# Patient Record
Sex: FEMALE | Race: WHITE | Employment: OTHER | URBAN - METROPOLITAN AREA
[De-identification: names, ages, dates, MRNs, and addresses within clinical notes are randomized per-mention and may not be internally consistent; named-entity substitution may affect disease eponyms.]

---

## 2017-04-07 RX ORDER — DILTIAZEM HYDROCHLORIDE 120 MG/1
CAPSULE, COATED, EXTENDED RELEASE ORAL
Qty: 90 CAPSULE | Refills: 3 | Status: SHIPPED | OUTPATIENT
Start: 2017-04-07 | End: 2018-04-13 | Stop reason: SDUPTHER

## 2017-05-17 ENCOUNTER — TELEPHONE (OUTPATIENT)
Dept: CARDIOLOGY CLINIC | Age: 82
End: 2017-05-17

## 2017-07-11 ENCOUNTER — HOSPITAL ENCOUNTER (OUTPATIENT)
Dept: WOMENS IMAGING | Age: 82
Discharge: HOME | End: 2017-07-11
Attending: NURSE PRACTITIONER | Admitting: FAMILY MEDICINE

## 2017-07-11 DIAGNOSIS — Z12.31 SCREENING MAMMOGRAM, ENCOUNTER FOR: ICD-10-CM

## 2017-07-20 ENCOUNTER — HOSPITAL ENCOUNTER (OUTPATIENT)
Dept: GENERAL RADIOLOGY | Age: 82
Discharge: HOME | End: 2017-07-20
Attending: INTERNAL MEDICINE | Admitting: INTERNAL MEDICINE

## 2017-07-20 ENCOUNTER — OFFICE VISIT (OUTPATIENT)
Dept: CARDIOLOGY CLINIC | Age: 82
End: 2017-07-20

## 2017-07-20 VITALS
SYSTOLIC BLOOD PRESSURE: 128 MMHG | DIASTOLIC BLOOD PRESSURE: 68 MMHG | HEIGHT: 61 IN | HEART RATE: 68 BPM | WEIGHT: 191.8 LBS | BODY MASS INDEX: 36.21 KG/M2

## 2017-07-20 DIAGNOSIS — R06.02 SOB (SHORTNESS OF BREATH): ICD-10-CM

## 2017-07-20 DIAGNOSIS — I10 ESSENTIAL HYPERTENSION: Primary | ICD-10-CM

## 2017-07-20 DIAGNOSIS — E78.5 HYPERLIPIDEMIA, UNSPECIFIED HYPERLIPIDEMIA TYPE: ICD-10-CM

## 2017-07-20 LAB
ALBUMIN SERPL-MCNC: 4.5 GM/DL (ref 3.4–5)
ALP BLD-CCNC: 35 IU/L (ref 40–128)
ALT SERPL-CCNC: 15 U/L (ref 10–40)
ANION GAP SERPL CALCULATED.3IONS-SCNC: 13 MMOL/L (ref 4–16)
AST SERPL-CCNC: 20 IU/L (ref 15–37)
BILIRUB SERPL-MCNC: 1.2 MG/DL (ref 0–1)
BUN BLDV-MCNC: 20 MG/DL (ref 6–23)
CALCIUM SERPL-MCNC: 10 MG/DL (ref 8.3–10.6)
CHLORIDE BLD-SCNC: 102 MMOL/L (ref 99–110)
CHOLESTEROL: 177 MG/DL
CO2: 28 MMOL/L (ref 21–32)
CREAT SERPL-MCNC: 0.8 MG/DL (ref 0.6–1.1)
GFR AFRICAN AMERICAN: >60 ML/MIN/1.73M2
GFR NON-AFRICAN AMERICAN: >60 ML/MIN/1.73M2
GLUCOSE FASTING: 84 MG/DL (ref 70–99)
HCT VFR BLD CALC: 40.9 % (ref 37–47)
HDLC SERPL-MCNC: 62 MG/DL
HEMOGLOBIN: 14 GM/DL (ref 12.5–16)
LDL CHOLESTEROL CALCULATED: 90 MG/DL
MCH RBC QN AUTO: 32 PG (ref 27–31)
MCHC RBC AUTO-ENTMCNC: 34.2 % (ref 32–36)
MCV RBC AUTO: 93.6 FL (ref 78–100)
PDW BLD-RTO: 12.7 % (ref 11.7–14.9)
PLATELET # BLD: 286 K/CU MM (ref 140–440)
PMV BLD AUTO: 11.4 FL (ref 7.5–11.1)
POTASSIUM SERPL-SCNC: 4.5 MMOL/L (ref 3.5–5.1)
RBC # BLD: 4.37 M/CU MM (ref 4.2–5.4)
SODIUM BLD-SCNC: 143 MMOL/L (ref 135–145)
TOTAL PROTEIN: 6.6 GM/DL (ref 6.4–8.2)
TRIGL SERPL-MCNC: 125 MG/DL
WBC # BLD: 6.2 K/CU MM (ref 4–10.5)

## 2017-07-20 PROCEDURE — 99214 OFFICE O/P EST MOD 30 MIN: CPT | Performed by: INTERNAL MEDICINE

## 2018-04-13 RX ORDER — DILTIAZEM HYDROCHLORIDE 120 MG/1
120 CAPSULE, COATED, EXTENDED RELEASE ORAL DAILY
Qty: 90 CAPSULE | Refills: 3 | Status: SHIPPED | OUTPATIENT
Start: 2018-04-13 | End: 2019-05-20 | Stop reason: SDUPTHER

## 2018-07-13 ENCOUNTER — HOSPITAL ENCOUNTER (OUTPATIENT)
Dept: WOMENS IMAGING | Age: 83
Discharge: HOME | End: 2018-07-13
Attending: FAMILY MEDICINE | Admitting: FAMILY MEDICINE

## 2018-07-13 DIAGNOSIS — Z12.31 SCREENING MAMMOGRAM, ENCOUNTER FOR: ICD-10-CM

## 2018-08-24 ENCOUNTER — OFFICE VISIT (OUTPATIENT)
Dept: CARDIOLOGY CLINIC | Age: 83
End: 2018-08-24

## 2018-08-24 VITALS
WEIGHT: 194.8 LBS | BODY MASS INDEX: 36.78 KG/M2 | HEIGHT: 61 IN | HEART RATE: 70 BPM | DIASTOLIC BLOOD PRESSURE: 70 MMHG | SYSTOLIC BLOOD PRESSURE: 120 MMHG

## 2018-08-24 DIAGNOSIS — I20.8 ANGINAL EQUIVALENT (HCC): ICD-10-CM

## 2018-08-24 DIAGNOSIS — I10 ESSENTIAL HYPERTENSION: Primary | ICD-10-CM

## 2018-08-24 PROBLEM — I20.89 ANGINAL EQUIVALENT (HCC): Status: ACTIVE | Noted: 2018-08-24

## 2018-08-24 PROCEDURE — G8598 ASA/ANTIPLAT THER USED: HCPCS | Performed by: INTERNAL MEDICINE

## 2018-08-24 PROCEDURE — 99213 OFFICE O/P EST LOW 20 MIN: CPT | Performed by: INTERNAL MEDICINE

## 2018-08-24 PROCEDURE — G8417 CALC BMI ABV UP PARAM F/U: HCPCS | Performed by: INTERNAL MEDICINE

## 2018-08-24 PROCEDURE — 1090F PRES/ABSN URINE INCON ASSESS: CPT | Performed by: INTERNAL MEDICINE

## 2018-08-24 PROCEDURE — G8427 DOCREV CUR MEDS BY ELIG CLIN: HCPCS | Performed by: INTERNAL MEDICINE

## 2018-08-24 PROCEDURE — 1101F PT FALLS ASSESS-DOCD LE1/YR: CPT | Performed by: INTERNAL MEDICINE

## 2018-08-24 PROCEDURE — G8400 PT W/DXA NO RESULTS DOC: HCPCS | Performed by: INTERNAL MEDICINE

## 2018-08-24 PROCEDURE — 4040F PNEUMOC VAC/ADMIN/RCVD: CPT | Performed by: INTERNAL MEDICINE

## 2018-08-24 PROCEDURE — 1123F ACP DISCUSS/DSCN MKR DOCD: CPT | Performed by: INTERNAL MEDICINE

## 2018-08-24 PROCEDURE — 1036F TOBACCO NON-USER: CPT | Performed by: INTERNAL MEDICINE

## 2018-08-24 NOTE — PROGRESS NOTES
CARDIOLOGY NOTE      8/24/2018    RE: Liam Marquis  (1935)                               TO:  Dr. Nellie Ross, DO      Thank you for involving me in taking care of your  patient Liam Marquis, who is a  81y.o. year old      female with past medical  history of  Htn, hyperlipidimea  is  seen today Patient  during this  visit c/o recurrent episodes of jacob neck pain with no exertion , thinks its reflux. .      Vitals:    08/24/18 1114   BP: 120/70   Pulse: 70       Current Outpatient Prescriptions   Medication Sig Dispense Refill    diltiazem (CARDIZEM CD) 120 MG extended release capsule Take 1 capsule by mouth daily 90 capsule 3    lisinopril (PRINIVIL;ZESTRIL) 20 MG tablet Take 20 mg by mouth daily      meclizine (ANTIVERT) 25 MG tablet Take 25 mg by mouth 3 times daily as needed      alendronate (FOSAMAX) 70 MG tablet Take 70 mg by mouth every 7 days      Potassium Chloride Katrin CR (KLOR-CON M20 PO) Take 1 tablet by mouth daily      escitalopram (LEXAPRO) 20 MG tablet Take 20 mg by mouth daily      meloxicam (MOBIC) 15 MG tablet Take 15 mg by mouth daily      Calcium Carb-Cholecalciferol (CALCIUM PLUS VITAMIN D3 PO) Take 1 tablet by mouth daily      Acetaminophen (TYLENOL PO) Take 2 tablets by mouth daily      Cholecalciferol (VITAMIN D3) 2000 UNITS CAPS Take 2 tablets by mouth daily       furosemide (LASIX) 40 MG tablet Take 40 mg by mouth daily.  raloxifene (EVISTA) 60 MG tablet Take 60 mg by mouth daily.  aspirin 81 MG tablet Take 81 mg by mouth daily.  multivitamin (THERAGRAN) per tablet Take 1 tablet by mouth daily.  Pantoprazole Sodium (PROTONIX) 40 MG PACK packet Take 40 mg by mouth daily.  b complex vitamins capsule Take 1 capsule by mouth daily.  FISH OIL 1,000 mg by Does not apply route daily. No current facility-administered medications for this visit. Allergies: Patient has no known allergies.   Past Medical History: Diagnosis Date    Depression     Dizzy spells     H/O 24 hour EKG monitoring 06/28/2006 06/28/2006 predominant rhythm is sinus, no ectopy noted, infrequent ventricular and supraventricular ectopic beats are seen, the max heart rate is 135, min 59 with an average of 77 bpm, longest R-R interval is 1.1 seconds    H/O cardiovascular stress test 06/26/2006 EF 67%, 08/9/2010 EF 70%, 10/18/2011 EF 70%    10/18/2011 EF 70%normal pattern of perfusion in all myocardial regions, resting ED 56ml, resting ES 12ml, there is no scintigraphic evidence of inducible myocardial ischemia, global left ventricular systolic function is normal, exercise capacity 4.6 METS it is mildly reduced, NO ECG changes    H/O cardiovascular stress test 5/13/2016    lexiscan-normal,EF70%    H/O complete electrocardiogram 08/05/2010 08/05/2010 on chart    H/O echocardiogram 4/30/13,8/6/09 4/13- WNL, EF >55% 08/09 EF 55% left ventricular systolic function is normal, mild aortic valve calcification, No PE    H/O echocardiogram 5/13/2016    EF 55-60%, Normal study    Hyperlipidemia     Hypertension     PVD (peripheral vascular disease) ([address]     Reflux     SOB (shortness of breath)     Wears glasses      Past Surgical History:   Procedure Laterality Date    BUNIONECTOMY      CATARACT REMOVAL      EYE SURGERY      GALLBLADDER SURGERY      SPINE SURGERY  10/2016    spinal cord stimulator    TONSILLECTOMY AND ADENOIDECTOMY      TUMOR REMOVAL      fatty tumor    WRIST GANGLION EXCISION       History reviewed. No pertinent family history.   Social History   Substance Use Topics    Smoking status: Never Smoker    Smokeless tobacco: Never Used    Alcohol use Yes      Comment: rarely, 1 cup of coffee daily          Review of systems:  HEENT: Neg  Card:Neck pain   GI;Neg  : Neg  Neuro: Neg  Psych: Neg  Derm: Neg  MS; Neg  All: Documented  Constitutional: Neg    Objective:      Physical Exam:  /70 (Position: Standing) Pulse 70   Ht 5' 1\" (1.549 m)   Wt 194 lb 12.8 oz (88.4 kg)   BMI 36.81 kg/m²   Wt Readings from Last 3 Encounters:   08/24/18 194 lb 12.8 oz (88.4 kg)   07/20/17 191 lb 12.8 oz (87 kg)   06/09/16 195 lb (88.5 kg)     Body mass index is 36.81 kg/m². GENERAL - Alert, oriented, pleasant, in no apparent distress. Head unremarkable  Eyes  Not injected conjunctiva  ENT  normal mucosa  Neck - Supple. No jugular venous distention noted. No carotid bruits. Cardiovascular  Normal S1 and S2 without obvious murmur or gallop. Extremities - No cyanosis, clubbing, or significant edema. Pulmonary  No respiratory distress. No wheezes or rales. Pulses: Bilateral radial and pedal pulses normal  Abdomen  no tenderness  Musculoskeletal  normal strength  Neurologic    There are  no gross focal neurologic abnormalities. Skin-  No rash  Affect; normal mood    DATA:  No results found for: CKTOTAL, CKMB, CKMBINDEX, TROPONINI  BNP:  No results found for: BNP  PT/INR:  No results found for: PTINR  No results found for: LABA1C  Lab Results   Component Value Date    CHOL 177 07/20/2017    TRIG 125 07/20/2017    HDL 62 07/20/2017    LDLCALC 90 07/20/2017    LDLDIRECT 115 (H) 10/14/2011     Lab Results   Component Value Date    ALT 15 07/20/2017    AST 20 07/20/2017     TSH:  No results found for: TSH    QUALITY MEASURES:    CHOL LOWERING:  No- if No Why  ANTIPLATELET:  No - if No why  BETA BLOCKER    no  IF  NO WHY  SMOKING HISTORY No COUNSELLED  No  ATRIAL FIBRILLATION  No   ANTICOAG: No        Assessment/ Plan:     Patient seen , interviewed and examined     PLAN:       - Neck pain ? Anginal equivalent,  [place]      -  Hypertension: Patients blood pressure is normal. Patient is advised about low sodium diet. Present medical regimen will not be changed. -  LIPID MANAGEMENT:  Available lipid  lab data reviewed  and patient was given dietary advice. NCEP- ATP III guidelines reviewed with patient.     -   Changes  in

## 2018-08-30 ENCOUNTER — PROCEDURE VISIT (OUTPATIENT)
Dept: CARDIOLOGY CLINIC | Age: 83
End: 2018-08-30

## 2018-08-30 DIAGNOSIS — I20.8 ANGINAL EQUIVALENT (HCC): ICD-10-CM

## 2018-08-30 DIAGNOSIS — I10 ESSENTIAL HYPERTENSION: ICD-10-CM

## 2018-08-30 LAB
LV EF: 60 %
LVEF MODALITY: NORMAL

## 2018-08-30 PROCEDURE — 93015 CV STRESS TEST SUPVJ I&R: CPT | Performed by: INTERNAL MEDICINE

## 2018-08-30 PROCEDURE — 78452 HT MUSCLE IMAGE SPECT MULT: CPT | Performed by: INTERNAL MEDICINE

## 2018-08-30 PROCEDURE — A9500 TC99M SESTAMIBI: HCPCS | Performed by: INTERNAL MEDICINE

## 2018-09-05 ENCOUNTER — TELEPHONE (OUTPATIENT)
Dept: CARDIOLOGY CLINIC | Age: 83
End: 2018-09-05

## 2018-09-05 NOTE — TELEPHONE ENCOUNTER
Spoke to patient regarding results of stress test.    Summary    Supervising physician Dr. Montes Outlaw Stress nuclear scintigraphic study suggestive of normal myocardial    perfusion.    Gated images demonstrate normal left ventricular systolic function with EF    of 60 %.         Recommendation    Continue present medical therapy and followup in office as scheduled

## 2018-10-16 ENCOUNTER — TELEPHONE (OUTPATIENT)
Dept: CARDIOLOGY CLINIC | Age: 83
End: 2018-10-16

## 2019-05-20 ENCOUNTER — OFFICE VISIT (OUTPATIENT)
Dept: CARDIOLOGY CLINIC | Age: 84
End: 2019-05-20
Payer: MEDICARE

## 2019-05-20 VITALS
SYSTOLIC BLOOD PRESSURE: 110 MMHG | WEIGHT: 174.4 LBS | HEART RATE: 64 BPM | DIASTOLIC BLOOD PRESSURE: 70 MMHG | HEIGHT: 61 IN | BODY MASS INDEX: 32.93 KG/M2

## 2019-05-20 DIAGNOSIS — I10 ESSENTIAL HYPERTENSION: Primary | ICD-10-CM

## 2019-05-20 PROCEDURE — 99213 OFFICE O/P EST LOW 20 MIN: CPT | Performed by: INTERNAL MEDICINE

## 2019-05-20 PROCEDURE — 1036F TOBACCO NON-USER: CPT | Performed by: INTERNAL MEDICINE

## 2019-05-20 PROCEDURE — G8400 PT W/DXA NO RESULTS DOC: HCPCS | Performed by: INTERNAL MEDICINE

## 2019-05-20 PROCEDURE — G8427 DOCREV CUR MEDS BY ELIG CLIN: HCPCS | Performed by: INTERNAL MEDICINE

## 2019-05-20 PROCEDURE — 4040F PNEUMOC VAC/ADMIN/RCVD: CPT | Performed by: INTERNAL MEDICINE

## 2019-05-20 PROCEDURE — G8417 CALC BMI ABV UP PARAM F/U: HCPCS | Performed by: INTERNAL MEDICINE

## 2019-05-20 PROCEDURE — G8598 ASA/ANTIPLAT THER USED: HCPCS | Performed by: INTERNAL MEDICINE

## 2019-05-20 PROCEDURE — 1090F PRES/ABSN URINE INCON ASSESS: CPT | Performed by: INTERNAL MEDICINE

## 2019-05-20 PROCEDURE — 1123F ACP DISCUSS/DSCN MKR DOCD: CPT | Performed by: INTERNAL MEDICINE

## 2019-05-20 RX ORDER — DILTIAZEM HYDROCHLORIDE 120 MG/1
120 CAPSULE, COATED, EXTENDED RELEASE ORAL DAILY
Qty: 90 CAPSULE | Refills: 3 | Status: SHIPPED | OUTPATIENT
Start: 2019-05-20 | End: 2020-09-08

## 2019-05-20 NOTE — PROGRESS NOTES
CARDIOLOGY NOTE      5/20/2019    RE: Jose Hutchison  (1935)                               TO:  Dr. Thomas Wilks DO            Jonathon Hodges is a 81 y.o. female who was seen today for management of  htn                                    HPI:   Patient is here for    - Hypertension,is  well controlled, pt is  compliant with medicines  - Hyperlipidimea, lipids are in acceptable range. Pt  is  compliant with medicines                  The patient does not have cardiac complaints  Has musculoskeletal pain per PCP    Jose Hutchison has the following history recorded in care path:  Patient Active Problem List    Diagnosis Date Noted    Anginal equivalent ([facility] [address] 08/24/2018    HTN (hypertension) 04/27/2015    SOB (shortness of breath) 04/26/2013    Dizzy spells     Hyperlipidemia      Current Outpatient Medications   Medication Sig Dispense Refill    diltiazem (CARDIZEM CD) 120 MG extended release capsule Take 1 capsule by mouth daily 90 capsule 3    lisinopril (PRINIVIL;ZESTRIL) 20 MG tablet Take 20 mg by mouth daily      meclizine (ANTIVERT) 25 MG tablet Take 25 mg by mouth 3 times daily as needed      alendronate (FOSAMAX) 70 MG tablet Take 70 mg by mouth every 7 days      Potassium Chloride Katrin CR (KLOR-CON M20 PO) Take 1 tablet by mouth daily      escitalopram (LEXAPRO) 20 MG tablet Take 20 mg by mouth daily      meloxicam (MOBIC) 15 MG tablet Take 15 mg by mouth daily      Calcium Carb-Cholecalciferol (CALCIUM PLUS VITAMIN D3 PO) Take 1 tablet by mouth daily      Acetaminophen (TYLENOL PO) Take 2 tablets by mouth daily      Cholecalciferol (VITAMIN D3) 2000 UNITS CAPS Take 2 tablets by mouth daily       furosemide (LASIX) 40 MG tablet Take 40 mg by mouth daily.  raloxifene (EVISTA) 60 MG tablet Take 60 mg by mouth daily.  aspirin 81 MG tablet Take 81 mg by mouth daily.  multivitamin (THERAGRAN) per tablet Take 1 tablet by mouth daily.       pertinent family history. Social History     Tobacco Use    Smoking status: Never Smoker    Smokeless tobacco: Never Used   Substance Use Topics    Alcohol use: Yes     Comment: rarely, 1 cup of coffee daily      Review of Systems:    Constitutional: Negative for diaphoresis and fatigue  Psychological:Negative for anxiety or depression  HENT: Negative for headaches, nasal congestion, sinus pain or vertigo  Eyes: Negative for visual disturbance. Endocrine: Negative for polydipsia/polyuria  Respiratory: Negative for shortness of breath  Cardiovascular: Negative for chest pain, dyspnea on exertion, claudication, edema, irregular heartbeat, murmur, palpitations or shortness of breath  Gastrointestinal: Negative for abdominal pain or heartburn  Genito-Urinary: Negative for urinary frequency/urgency  Musculoskeletal: Negative for muscle pain, muscular weakness, negative for pain in arm and leg or swelling in foot and leg  Neurological: Negative for dizziness, headaches, memory loss, numbness/tingling, visual changes, syncope  Dermatological: Negative for rash    Objective:  /70   Pulse 64   Ht 5' 1\" (1.549 m)   Wt 174 lb 6.4 oz (79.1 kg)   BMI 32.95 kg/m²   Wt Readings from Last 3 Encounters:   05/20/19 174 lb 6.4 oz (79.1 kg)   08/24/18 194 lb 12.8 oz (88.4 kg)   07/20/17 191 lb 12.8 oz (87 kg)     Body mass index is 32.95 kg/m². GENERAL - Alert, oriented, pleasant, in no apparent distress. EYES: No jaundice, no conjunctival pallor. SKIN: It is warm & dry. No rashes. No Echhymosis    HEENT - No clinically significant abnormalities seen. Neck - Supple. No jugular venous distention noted. No carotid bruits. Cardiovascular - Normal S1 and S2 without obvious murmur or gallop. Extremities - No cyanosis, clubbing, or significant edema. Pulmonary - No respiratory distress. No wheezes or rales. Abdomen - No masses, tenderness, or organomegaly. Musculoskeletal - No significant edema.  No joint deformities. No muscle wasting. Neurologic - Cranial nerves II through XII are grossly intact. There were no gross focal neurologic abnormalities. Lab Review   No results found for: CKTOTAL, CKMB, CKMBINDEX, TROPONINT  BNP:  No results found for: BNP  PT/INR:  No results found for: INR  No results found for: LABA1C  Lab Results   Component Value Date    WBC 6.2 07/20/2017    HCT 40.9 07/20/2017    MCV 93.6 07/20/2017     07/20/2017     Lab Results   Component Value Date    CHOL 177 07/20/2017    TRIG 125 07/20/2017    HDL 62 07/20/2017    LDLCALC 90 07/20/2017    LDLDIRECT 115 (H) 10/14/2011     Lab Results   Component Value Date    ALT 15 07/20/2017    AST 20 07/20/2017     BMP:    Lab Results   Component Value Date     07/20/2017    K 4.5 07/20/2017     07/20/2017    CO2 28 07/20/2017    BUN 20 07/20/2017    CREATININE 0.8 07/20/2017     CMP:   Lab Results   Component Value Date     07/20/2017    K 4.5 07/20/2017     07/20/2017    CO2 28 07/20/2017    BUN 20 07/20/2017    PROT 6.6 07/20/2017     TSH:  No results found for: TSH, TSHHS        Impression:    No diagnosis found. Patient Active Problem List   Diagnosis Code    Dizzy spells R42    Hyperlipidemia E78.5    SOB (shortness of breath) R06.02    HTN (hypertension) I10    Anginal equivalent (HCC) I20.8       Assessment & Plan:    -  Hypertension: Patients blood pressure is normal. Patient is advised about low sodium diet. Present medical regimen will not be changed. -  LIPID MANAGEMENT:  Available lipid  lab data reviewed  and patient was given dietary advice. NCEP- ATP III guidelines reviewed with patient. -   Changes  in medicines made:  No                                        Christina Jones MA  [facility] [place]

## 2019-07-17 ENCOUNTER — HOSPITAL ENCOUNTER (OUTPATIENT)
Dept: WOMENS IMAGING | Age: 84
Discharge: HOME | End: 2019-07-17
Payer: MEDICARE

## 2019-07-17 DIAGNOSIS — Z12.31 VISIT FOR SCREENING MAMMOGRAM: ICD-10-CM

## 2019-07-17 PROCEDURE — 77067 SCR MAMMO BI INCL CAD: CPT

## 2020-06-15 ENCOUNTER — TELEMEDICINE (OUTPATIENT)
Dept: CARDIOLOGY CLINIC | Age: 85
End: 2020-06-15
Payer: MEDICARE

## 2020-06-15 ENCOUNTER — NURSE ONLY (OUTPATIENT)
Dept: CARDIOLOGY CLINIC | Age: 85
End: 2020-06-15

## 2020-06-15 VITALS — DIASTOLIC BLOOD PRESSURE: 82 MMHG | HEART RATE: 80 BPM | SYSTOLIC BLOOD PRESSURE: 120 MMHG

## 2020-06-15 PROCEDURE — 99999 PR OFFICE/OUTPT VISIT,PROCEDURE ONLY: CPT | Performed by: INTERNAL MEDICINE

## 2020-06-15 PROCEDURE — 99443 PR PHYS/QHP TELEPHONE EVALUATION 21-30 MIN: CPT | Performed by: INTERNAL MEDICINE

## 2020-06-15 RX ORDER — LISINOPRIL 40 MG/1
40 TABLET ORAL DAILY
COMMUNITY

## 2020-06-15 RX ORDER — MULTIVIT WITH MINERALS/LUTEIN
1000 TABLET ORAL DAILY
COMMUNITY

## 2020-06-15 RX ORDER — FOLIC ACID 0.8 MG
TABLET ORAL
COMMUNITY

## 2020-06-15 RX ORDER — TRAMADOL HYDROCHLORIDE 50 MG/1
50 TABLET ORAL 2 TIMES DAILY
COMMUNITY

## 2020-06-15 RX ORDER — ATORVASTATIN CALCIUM 10 MG/1
10 TABLET, FILM COATED ORAL DAILY
COMMUNITY

## 2020-06-15 NOTE — PROGRESS NOTES
CARDIOLOGY NOTE      6/15/2020    RE: Kera Lynch  (1935)                               TO:  Dr. Neva Montgomery,             Antony Mendoza is a 81 y.o. female who was seen today for management of  htn                                    HPI:   Patient is here for    - Hypertension,isnot  well controlled, pt is  compliant with medicines  - Hyperlipidimea, lipids are in acceptable range. Pt  is  compliant with medicines                  The patient does not have cardiac complaints  Gets dizzy occasinally, no syncope, storm when standing from sitting , has no cp, sob. Kera Lynch has the following history recorded in care path:  Patient Active Problem List    Diagnosis Date Noted    Anginal equivalent ([facility] [address] 08/24/2018    HTN (hypertension) 04/27/2015    SOB (shortness of breath) 04/26/2013    Dizzy spells     Hyperlipidemia      Current Outpatient Medications   Medication Sig Dispense Refill    lisinopril (PRINIVIL;ZESTRIL) 40 MG tablet Take 40 mg by mouth daily      traMADol (ULTRAM) 50 MG tablet Take 50 mg by mouth 2 times daily.       atorvastatin (LIPITOR) 10 MG tablet Take 10 mg by mouth daily      Magnesium 500 MG CAPS Take by mouth      vitamin E 1000 units capsule Take 1,000 Units by mouth daily      diltiazem (CARDIZEM CD) 120 MG extended release capsule Take 1 capsule by mouth daily 90 capsule 3    meclizine (ANTIVERT) 25 MG tablet Take 25 mg by mouth 3 times daily as needed      Potassium Chloride Katrin CR (KLOR-CON M20 PO) Take 1 tablet by mouth daily      escitalopram (LEXAPRO) 20 MG tablet Take 10 mg by mouth daily       meloxicam (MOBIC) 15 MG tablet Take 15 mg by mouth daily      Calcium Carb-Cholecalciferol (CALCIUM PLUS VITAMIN D3 PO) Take 1 tablet by mouth daily      Acetaminophen (TYLENOL PO) Take 2 tablets by mouth daily      Cholecalciferol (VITAMIN D3) 2000 UNITS CAPS Take 2 tablets by mouth daily       furosemide (LASIX) 40 MG SURGERY      GALLBLADDER SURGERY      SPINE SURGERY  10/2016    spinal cord stimulator    TONSILLECTOMY AND ADENOIDECTOMY      TUMOR REMOVAL      fatty tumor    WRIST GANGLION EXCISION        As reviewed No family history on file. Social History     Tobacco Use    Smoking status: Never Smoker    Smokeless tobacco: Never Used   Substance Use Topics    Alcohol use: Yes     Comment: rarely, 1 cup of coffee daily      Review of Systems:    Constitutional: Negative for diaphoresis and fatigue  Psychological:Negative for anxiety or depression  HENT: Negative for headaches, nasal congestion, sinus pain or vertigo  Eyes: Negative for visual disturbance. Endocrine: Negative for polydipsia/polyuria  Respiratory: Negative for shortness of breath  Cardiovascular: Negative for chest pain, dyspnea on exertion, claudication, edema, irregular heartbeat, murmur, palpitations or shortness of breath  Gastrointestinal: Negative for abdominal pain or heartburn  Genito-Urinary: Negative for urinary frequency/urgency  Musculoskeletal: Negative for muscle pain, muscular weakness, negative for pain in arm and leg or swelling in foot and leg  Neurological: near syncope  Dermatological: Negative for rash    Objective: There were no vitals taken for this visit. Wt Readings from Last 3 Encounters:   05/20/19 174 lb 6.4 oz (79.1 kg)   08/24/18 194 lb 12.8 oz (88.4 kg)   07/20/17 191 lb 12.8 oz (87 kg)     There is no height or weight on file to calculate BMI. GENERAL - Alert, oriented, pleasant, in no apparent distress.     Lab Review   No results found for: CKTOTAL, CKMB, CKMBINDEX, TROPONINT  BNP:  No results found for: BNP  PT/INR:  No results found for: INR  No results found for: LABA1C  Lab Results   Component Value Date    WBC 6.2 07/20/2017    HCT 40.9 07/20/2017    MCV 93.6 07/20/2017     07/20/2017     Lab Results   Component Value Date    CHOL 177 07/20/2017    TRIG 125 07/20/2017    HDL 62 07/20/2017    LDLCALC 90 07/20/2017    LDLDIRECT 115 (H) 10/14/2011     Lab Results   Component Value Date    ALT 15 07/20/2017    AST 20 07/20/2017     BMP:    Lab Results   Component Value Date     07/20/2017    K 4.5 07/20/2017     07/20/2017    CO2 28 07/20/2017    BUN 20 07/20/2017    CREATININE 0.8 07/20/2017     CMP:   Lab Results   Component Value Date     07/20/2017    K 4.5 07/20/2017     07/20/2017    CO2 28 07/20/2017    BUN 20 07/20/2017    PROT 6.6 07/20/2017     TSH:  No results found for: TSH, TSHHS      Impression:    1. Essential hypertension       Patient Active Problem List   Diagnosis Code    Dizzy spells R42    Hyperlipidemia E78.5    SOB (shortness of breath) R06.02    HTN (hypertension) I10    Anginal equivalent (HCC) I20.8       Assessment & Plan:    156/96,  70    - orthostatics, echo    -  Hypertension: Patients blood pressure is normal. Patient is advised about low sodium diet. Present medical regimen will not be changed. -  LIPID MANAGEMENT:  Available lipid  lab data reviewed  and patient was given dietary advice. NCEP- ATP III guidelines reviewed with patient. -   Changes  in medicines made: No                            I confirm that this visit was completed in a telehealth setting ,using synchronous audiotechnology for real time patient interaction . The patient identity with name and date of birth was confirmed . This evaluation of patient was done by telehealth in the setting of FEJCO-14 Public health emergency , which precluded assurance of safe in person visit at the time of service. The patient consented to and accepts potential risks associated with telemedical evaluation and care was taken to assess rustam presence of any medical issues that would be more  appropriate for expedited in -person care.      Pursuant to the emergency declaration under the [address], [address] and Response Supplemental Appropriations Act, this Virtual  Visit was conducted, with patient's consent, to reduce the patient's risk of exposure to COVID-19 and provide continuity of care for an established patient. Services were provided through a  discussion on pphone to substitute for in-person clinic visit. TELEPHONE WAS USED TO COMMUNICATE WITH PT     I Confirm this is a Patient Initiated Episode with an Established Patient who has not had a related appointment within my department in the past 7 days or scheduled within the next 24 hours. The call was not tied to face to face office visit or procedure that has occurred in in prior 7 days.   Based on our conversation /evaluation , a subsequent office visit for the patient's problem is not indicated for  24 hrs      Time spent; 25 m  Location; [facility], [address], [address] [facility]  Office staff abe [employer] were utilised           Anabell Borrego MD    [facility] [place]

## 2020-06-15 NOTE — PROGRESS NOTES
Per dr. Christine Sagastume, pt advised to wear compression stockings, medium pressure. Pt verbalized understanding.

## 2020-07-09 ENCOUNTER — PROCEDURE VISIT (OUTPATIENT)
Dept: CARDIOLOGY CLINIC | Age: 85
End: 2020-07-09
Payer: MEDICARE

## 2020-07-09 PROCEDURE — 93880 EXTRACRANIAL BILAT STUDY: CPT | Performed by: INTERNAL MEDICINE

## 2020-07-13 ENCOUNTER — TELEPHONE (OUTPATIENT)
Dept: CARDIOLOGY CLINIC | Age: 85
End: 2020-07-13

## 2020-07-13 NOTE — TELEPHONE ENCOUNTER
Notified pt of results.       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     Normal vertebral flow.

## 2020-07-28 ENCOUNTER — HOSPITAL ENCOUNTER (OUTPATIENT)
Dept: WOMENS IMAGING | Age: 85
Discharge: HOME | End: 2020-07-28
Payer: MEDICARE

## 2020-07-28 PROCEDURE — 77063 BREAST TOMOSYNTHESIS BI: CPT

## 2020-09-08 RX ORDER — DILTIAZEM HYDROCHLORIDE 120 MG/1
120 CAPSULE, COATED, EXTENDED RELEASE ORAL DAILY
Qty: 90 CAPSULE | Refills: 3 | Status: SHIPPED | OUTPATIENT
Start: 2020-09-08 | End: 2021-07-26 | Stop reason: SDUPTHER

## 2020-10-06 ENCOUNTER — HOSPITAL ENCOUNTER (OUTPATIENT)
Dept: PHYSICAL THERAPY | Age: 85
Setting detail: THERAPIES SERIES
Discharge: HOME | End: 2020-10-06
Payer: MEDICARE

## 2020-10-06 PROCEDURE — 97162 PT EVAL MOD COMPLEX 30 MIN: CPT

## 2020-10-06 PROCEDURE — 97112 NEUROMUSCULAR REEDUCATION: CPT

## 2020-10-06 NOTE — PLAN OF CARE
Outpatient Physical Therapy           [place]           [x] Phone: [phone]   Fax: [phone]  [facility]           [] Phone: [phone]   Fax: [phone]     To: Referring Practitioner: Burgess Doni DO   From: Mignon Rivers, PT, DPT     Patient: Denice Briseno       : 1935  Diagnosis: Diagnosis: Peripheral Vertigo   Treatment Diagnosis: Treatment Diagnosis: Impaired gait, impaired balance   Date: 10/6/2020    Physical Therapy Certification/Re-Certification Form  Dear Dr. Ainsley Cardona,  The following patient has been evaluated for physical therapy services and for therapy to continue, insurance requires physician review of the treatment plan initially and every 90 days. Please review the attached evaluation and/or summary of the patient's plan of care, and verify that you agree therapy should continue by signing the attached document and sending it back to our office. Assessment:    Assessment: Pt is an 73-year-old female who presents to therapy with feelings of uneasiness and \"vertigo\" for years which has progressively worsened. Upon assessment, pt was negative for BL posterior SCC BPPV and negative for BL horizontal SCC BPPV. Pt does presents with some VOR symptoms and more so VSR symptoms. 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 Pt would benefit from skilled therapy interventions to address listed impairments, progress toward goal completion, and improve ADL independence and safety. PT also warranted to reduce further decline or future injury. Patient agrees with established plan of care and assisted in the development of their short term and long term goals. Patient had no adverse reaction with initial treatment and there are no barriers to learning. Demonstrates no mental or cognitive disorder.      Plan of Care/Treatment to date:  [x] Therapeutic Exercise  [] Modalities:  [x] Therapeutic Activity     []

## 2020-10-06 NOTE — FLOWSHEET NOTE
Outpatient Physical Therapy  [place]           [x] Phone: [phone]   Fax: [phone]  [place]           [] Phone: [phone]   Fax: [phone]        Physical Therapy Daily Treatment Note  Date:  10/6/2020    Patient Name:  Allison Farr    :  1935  MRN: [id number]  Restrictions/Precautions:  none  Diagnosis:   Diagnosis: Peripheral Vertigo  Date of Injury/Surgery:   Treatment Diagnosis: Treatment Diagnosis: Impaired gait, impaired balance    Insurance/Certification information: PT Insurance Information: [facility]   Referring Physician:  Referring Practitioner: Aftab Bonilla DO  Next Doctor Visit:    Plan of care signed (Y/N):  sent  Outcome Measure: DGI:   Visit# / total visits:    Pain level: 0/10   Goals:       Short term goals  Time Frame for Short term goals: refer to [facility]  Long term goals  Time Frame for Long term goals : 4 weeks  Long term goal 1: Pt will improve DHI to 46 or lower to show subjective improvement in conditon  Long term goal 2: Pt will improve DGI to 22/24 or higher to show reduced fall risk and improved balance  Long term goal 3: Pt will report overall improvement of condition by 50% or more    Summary of Evaluation: Assessment: Pt is an 28-year-old female who presents to therapy with feelings of uneasiness and \"vertigo\" for years which has progressively worsened. Upon assessment, pt was negative for BL posterior SCC BPPV and negative for BL horizontal SCC BPPV. Pt does presents with some VOR symptoms and more so VSR symptoms. 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 Pt would benefit from skilled therapy interventions to address listed impairments, progress toward goal completion, and improve ADL independence and safety. PT also warranted to reduce further decline or future injury. Subjective:  See eval         Any changes in Ambulatory Summary Sheet?   None        Objective: See eval   Prior to today's treatment session, patient was screened for signs and symptoms related to COVID-19 including but not limited to verbally answering questions related to feeling ill, cough, or SOB, along with taking temperature via forehead thermometer. Patient presented with all negative signs and symptoms and had no fever >100 degrees Fahrenheit this date. Exercises: (No more than 4 columns)   Exercise/Equipment 10/6/20 Date Date           WARM UP                     TABLE      VOR x1 Horizontal  Vertical 2x15\" ea                                STANDING      DGI x5'                                              PROPRIOCEPTION                                    MODALITIES                      Other Therapeutic Activities/Education:  HEP and importance of completion, POC and goals, anatomy and physiology related to condition. Vestibular system and treatment. Home Exercise Program:    10/6: VOR x1 horizontal and vertical    Manual Treatments:  none      Modalities:  none      Communication with other providers:  POC sent      Assessment:  0/10 end pain. Pt tolerated today's treatment without any adverse reactions or complications this date. Assessment: Pt is an 58-year-old female who presents to therapy with feelings of uneasiness and \"vertigo\" for years which has progressively worsened. Upon assessment, pt was negative for BL posterior SCC BPPV and negative for BL horizontal SCC BPPV. Pt does presents with some VOR symptoms and more so VSR symptoms. 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 Pt would benefit from skilled therapy interventions to address listed impairments, progress toward goal completion, and improve ADL independence and safety. PT also warranted to reduce further decline or future injury.  Patient agrees with established plan of care and assisted in the development of their short term and long term goals. Patient had no adverse reaction with initial treatment and there are no barriers to learning. Demonstrates no mental or cognitive disorder.        Plan for Next Session: Specific instructions for Next Treatment: balance, gait      Time In / Time Out:     4260-4740    Timed Code/Total Treatment Minutes:  61': 8' NMR x1, 51' Eval x1      Next Progress Note due:  dc      Plan of Care Interventions:  [x] Therapeutic Exercise  [] Modalities:  [x] Therapeutic Activity     [] Ultrasound  [] Estim  [x] Gait Training      [] Cervical Traction [] Lumbar Traction  [x] Neuromuscular Re-education    [] Cold/hotpack [] Iontophoresis   [x] Instruction in HEP      [] Vasopneumatic   [] Dry Needling    [x] Manual Therapy               [] Aquatic Therapy              Electronically signed by:  Otha Phoenix, PT, DPT 10/6/2020, 12:52 PM

## 2020-10-06 NOTE — PROGRESS NOTES
Physical Therapy  Initial Assessment  Date: 10/6/2020  Patient Name: Jacquelin Aleman  MRN: [id number]  : 1935     Treatment Diagnosis: Impaired gait, impaired balance    Restrictions: none       Subjective   General  Chart Reviewed: Yes  Patient assessed for rehabilitation services?: Yes  Referring Practitioner: Aissatou Alcantar DO  Referral Date : 20  Diagnosis: Peripheral Vertigo  Follows Commands: Within Functional Limits  PT Visit Information  PT Insurance Information: [facility]  Subjective  Subjective: Pt notes that she has had \"vertigo\" for years but has gradually gotten worse. She had a brain CT scan, that was normal and a liver scan that was normal as well. She notes that she likes to walk but sometimes feels like she is walking drunk. She notes that her L foot also causes issues with clearing steps that causes her to trip. Pt notes that she has both self-spinning and room spinning and her head feels heavy. Pt is unaware of rolling R or L because she avoids it due to her spine issues. Pt notes ringing in the BL ears, R >L which has gotten worse. Pt denies blurry or double vision but notes possibly not seeing as clear. Pt denies any recent infection/ illness or head injury.   Pain Screening  Patient Currently in Pain: No  Vital Signs  Patient Currently in Pain: No    Vision/Hearing  Vision  Vision: Within Functional Limits  Hearing  Hearing: Within functional limits    Orientation  Orientation  Overall Orientation Status: Within Normal Limits    Social/Functional History  Social/Functional History  Lives With: Alone  Type of Home: House  ADL Assistance: Independent  Homemaking Assistance: Independent  Ambulation Assistance: Independent  Transfer Assistance: Independent  Active : Yes  Mode of Transportation: Car  Occupation: Retired    Objective     Observation/Palpation  Posture: Fair  Observation: Gait: no AD use, mild linear sway with mild reduced stance time LLE    Sensation  Overall Sensation Status: WNL        Subjective  Patient experiences spells of vertigo?: Yes  Describe Vertigo: Uneasiness; Loss of Balance  How long do these spells last?: Seconds  Vertigo Is: Induced by motion; Induced by position  Patient experiences disequilibrium?: Yes  Disequilibrium is?: Induced by position changes; Induced by motion  Symptoms worse with: Self motion  Associated hearing/ear symptoms: Yes  Hearing/Ear symptoms: Tinnitus  Which Side?: Both Sides(R>L)  Any recent Illness or Infections?: No  Any recent accidents or head trauma?: No  Any history of migraines or headaches?: No  Oculomotor Examination  Oculomotor Examination: Yes  Spontaneous Nystagmus: No  Gaze Holding Nystagmus: No  Eye Movement Range: Conjugate  Vergence: WNL  Smooth Pursuits: WNL  Saccades: WNL (2 or less)  Head Impulse Test/ Head Thrust Test: Positive to the right  Positional Testing  Vestebral artery test in sitting position: Negative  Right [place] Hallpike: Negative  Left M[place] Hallpike: Negative  Right Roll Test: Negative  Left Roll Test: Negative    Assessment   Conditions Requiring Skilled Therapeutic Intervention  Body structures, Functions, Activity limitations: Decreased functional mobility ; Decreased ADL status; Decreased balance;Decreased high-level IADLs; Vestibular Impairment  Assessment: Pt is an 69-year-old female who presents to therapy with feelings of uneasiness and \"vertigo\" for years which has progressively worsened. Upon assessment, pt was negative for BL posterior SCC BPPV and negative for BL horizontal SCC BPPV. Pt does presents with some VOR symptoms and more so VSR symptoms. 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 Pt would benefit from skilled therapy interventions to address listed impairments, progress toward goal completion, and improve ADL independence and safety. PT also warranted to reduce further decline or future injury.   Treatment Diagnosis: Impaired gait, impaired balance  Prognosis: Good  Decision Making: Medium Complexity  History: see above. PLOF: independent  Exam: see above. Clinical Presentation: see above  Barriers to Learning: none. Learning style: demonstration  REQUIRES PT FOLLOW UP: Yes  Treatment Initiated : yes on 10/6    Patient agrees with established plan of care and assisted in the development of their short term and long term goals. Patient had no adverse reaction with initial treatment and there are no barriers to learning. Demonstrates no mental or cognitive disorder.          Plan   Plan  Times per week: 2  Times per day: Daily  Plan weeks: 4  Specific instructions for Next Treatment: balance, gait  Current Treatment Recommendations: Strengthening, IADL Training, Neuromuscular Re-education, Home Exercise Program, ROM, Manual Therapy - Soft Tissue Mobilization, Safety Education & Training, Balance Training, Vestibular Rehab, Gait Training, ADL/Self-care Training, Functional Mobility Training      OutComes Score   DGI: 18/24  [address] [place] [address]: 56    Goals  Short term goals  Time Frame for Short term goals: refer to [facility]  Long term goals  Time Frame for Long term goals : 4 weeks  Long term goal 1: Pt will improve DHI to 46 or lower to show subjective improvement in conditon  Long term goal 2: Pt will improve DGI to 22/24 or higher to show reduced fall risk and improved balance  Long term goal 3: Pt will report overall improvement of condition by 50% or more  Patient Goals   Patient goals : improve balance       Therapy Time: 8061-7501  Atul Angela, PT, DPT

## 2020-10-08 NOTE — FLOWSHEET NOTE
Patients Plan of Care was received and signed. Signed POC was scanned and placed in the patients chart.     Paige Shaw

## 2020-10-12 NOTE — DISCHARGE SUMMARY
Outpatient Physical Therapy           [place]           [x] Phone: [phone]   Fax: [phone]  [place]           [] Phone: [phone]   Fax: [phone]      To: Referring Practitioner: Rafaela Conway DO   From: Noel Ayala, PT, DPT     Patient: Tyshawn Beard                  : 1935  Diagnosis:  Diagnosis: Peripheral Vertigo      Date: 10/12/2020  Treatment Diagnosis: Treatment Diagnosis: Impaired gait, impaired balance       []  Progress Note                [x]  Discharge Note    Evaluation Date:  10/06/20         Total Visits to date:  1 (eval) Cancels/No-shows to date:  0    Subjective:  Pt called back the afternoon of her eval (10/06) and cancelled all of her appointments, noted she is not going to come back. No reason was given as to why she was not returning. Plan of Care/Treatment to date:  [] Therapeutic Exercise    [] Modalities:  [] Therapeutic Activity     [] Ultrasound  [] Electrical Stimulation  [] Gait Training      [] Cervical Traction   [] Lumbar Traction  [x] Neuromuscular Re-education  [] Cold/hotpack [] Iontophoresis  [] Instruction in HEP      Other:  [] Manual Therapy       []  Vasopneumatic  [] Aquatic Therapy       []                          Objective/Significant Findings At Last Visit/Comments:  N/a pt did not return for any follow up appointments. Assessment:   Unable to assess. Pt did not return for any follow up appointments. Changes to goals:  Unable to assess due to pt not returning for any follow up appointments.      Short term goals  Time Frame for Short term goals: refer to [facility]  Long term goals  Time Frame for Long term goals : 4 weeks  Long term goal 1: Pt will improve DHI to 46 or lower to show subjective improvement in conditon  Long term goal 2: Pt will improve DGI to 22/24 or higher to show reduced fall risk and improved balance  Long term goal 3: Pt will report overall improvement of condition by 50% or more      Patient Status: [] Continue per initial plan of Care     [x] Patient now discharged     [] Additional visits requested, Please re-certify for additional visits: If we are requesting more visits, we fully anticipate the patient's condition is expected to improve within the treatment timeframe we are requesting. Electronically signed by:  Marlen Waggoner PT, DPT 10/12/2020, 9:37 AM    If you have any questions or concerns, please don't hesitate to call.   Thank you for your referral.    Physician Signature:______________________ Date:______ Time: ________  By signing above, therapists plan is approved by physician

## 2020-10-12 NOTE — FLOWSHEET NOTE
Patients Plan of Care was received and signed. Signed POC was scanned and placed in the patients chart.     Everrett Babinski

## 2020-10-19 ENCOUNTER — OFFICE VISIT (OUTPATIENT)
Dept: CARDIOLOGY CLINIC | Age: 85
End: 2020-10-19
Payer: MEDICARE

## 2020-10-19 VITALS
DIASTOLIC BLOOD PRESSURE: 80 MMHG | HEIGHT: 61 IN | WEIGHT: 165.5 LBS | HEART RATE: 84 BPM | BODY MASS INDEX: 31.25 KG/M2 | SYSTOLIC BLOOD PRESSURE: 136 MMHG

## 2020-10-19 PROCEDURE — 4040F PNEUMOC VAC/ADMIN/RCVD: CPT | Performed by: INTERNAL MEDICINE

## 2020-10-19 PROCEDURE — 1123F ACP DISCUSS/DSCN MKR DOCD: CPT | Performed by: INTERNAL MEDICINE

## 2020-10-19 PROCEDURE — G8427 DOCREV CUR MEDS BY ELIG CLIN: HCPCS | Performed by: INTERNAL MEDICINE

## 2020-10-19 PROCEDURE — G8484 FLU IMMUNIZE NO ADMIN: HCPCS | Performed by: INTERNAL MEDICINE

## 2020-10-19 PROCEDURE — 99213 OFFICE O/P EST LOW 20 MIN: CPT | Performed by: INTERNAL MEDICINE

## 2020-10-19 PROCEDURE — 1036F TOBACCO NON-USER: CPT | Performed by: INTERNAL MEDICINE

## 2020-10-19 PROCEDURE — G8417 CALC BMI ABV UP PARAM F/U: HCPCS | Performed by: INTERNAL MEDICINE

## 2020-10-19 PROCEDURE — G8400 PT W/DXA NO RESULTS DOC: HCPCS | Performed by: INTERNAL MEDICINE

## 2020-10-19 PROCEDURE — 1090F PRES/ABSN URINE INCON ASSESS: CPT | Performed by: INTERNAL MEDICINE

## 2020-10-19 NOTE — PROGRESS NOTES
CARDIOLOGY NOTE      10/19/2020    RE: Sorin Betancourt  (1935)                               TO:  DO Raymond Josephkings Denny is a 81 y.o. female who was seen today for management of  htn                                    HPI:                   The patient does not have cardiac complaints  Patient also seen  for    - Hypertension,is  well controlled, pt is  compliant with medicines  - Hyperlipidimea, importance of hyperlipidimea discussed with pt. Sorin Betancourt has the following history recorded in care path:  Patient Active Problem List    Diagnosis Date Noted    Anginal equivalent ([facility] [address] 08/24/2018    HTN (hypertension) 04/27/2015    SOB (shortness of breath) 04/26/2013    Dizzy spells     Hyperlipidemia      Current Outpatient Medications   Medication Sig Dispense Refill    dilTIAZem (CARDIZEM CD) 120 MG extended release capsule Take 1 capsule by mouth daily 90 capsule 3    lisinopril (PRINIVIL;ZESTRIL) 40 MG tablet Take 40 mg by mouth daily      traMADol (ULTRAM) 50 MG tablet Take 50 mg by mouth 2 times daily.  atorvastatin (LIPITOR) 10 MG tablet Take 10 mg by mouth daily      Magnesium 500 MG CAPS Take by mouth      vitamin E 1000 units capsule Take 1,000 Units by mouth daily      meclizine (ANTIVERT) 25 MG tablet Take 25 mg by mouth 3 times daily as needed      Potassium Chloride Katrin CR (KLOR-CON M20 PO) Take 1 tablet by mouth daily      escitalopram (LEXAPRO) 20 MG tablet Take 10 mg by mouth daily       meloxicam (MOBIC) 15 MG tablet Take 15 mg by mouth daily      Calcium Carb-Cholecalciferol (CALCIUM PLUS VITAMIN D3 PO) Take 1 tablet by mouth daily      Acetaminophen (TYLENOL PO) Take 2 tablets by mouth daily      Cholecalciferol (VITAMIN D3) 2000 UNITS CAPS Take 2 tablets by mouth daily       furosemide (LASIX) 40 MG tablet Take 40 mg by mouth daily.  raloxifene (EVISTA) 60 MG tablet Take 60 mg by mouth daily.  aspirin 81 MG tablet Take 81 mg by mouth daily.  multivitamin (THERAGRAN) per tablet Take 1 tablet by mouth daily.  Pantoprazole Sodium (PROTONIX) 40 MG PACK packet Take 40 mg by mouth daily.  b complex vitamins capsule Take 1 capsule by mouth daily.  FISH OIL 1,000 mg by Does not apply route daily.  Elastic Bandages & Supports (MEDICAL COMPRESSION THIGH HIGH) MISC 1 Package by Does not apply route daily Wear daily and remove nightly   20 - 30 mmgh 1 each 1     No current facility-administered medications for this visit. Allergies: Patient has no known allergies. Past Medical History:   Diagnosis Date    Depression     Dizzy spells     H/O 24 hour EKG monitoring 06/28/2006 06/28/2006 predominant rhythm is sinus, no ectopy noted, infrequent ventricular and supraventricular ectopic beats are seen, the max heart rate is 135, min 59 with an average of 77 bpm, longest R-R interval is 1.1 seconds    H/O cardiovascular stress test 06/26/2006 EF 67%, 08/9/2010 EF 70%, 10/18/2011 EF 70%    10/18/2011 EF 70%normal pattern of perfusion in all myocardial regions, resting ED 56ml, resting ES 12ml, there is no scintigraphic evidence of inducible myocardial ischemia, global left ventricular systolic function is normal, exercise capacity 4.6 METS it is mildly reduced, NO ECG changes    H/O cardiovascular stress test 5/13/2016    lexiscan-normal,EF70%    H/O complete electrocardiogram 08/05/2010 08/05/2010 on chart    H/O Doppler carotid ultrasound 07/09/2020    Mild (0-49%) disease of the Bilateral Internal carotid artery.      H/O echocardiogram 4/30/13,8/6/09 4/13- WNL, EF >55% 08/09 EF 55% left ventricular systolic function is normal, mild aortic valve calcification, No PE    H/O echocardiogram 5/13/2016    EF 55-60%, Normal study    Hx of cardiovascular stress test 08/30/2018    EF 60%  Normal study    Hyperlipidemia     Hypertension     [facility]     Reflux     SOB (shortness of breath)     Wears glasses      Past Surgical History:   Procedure Laterality Date    BUNIONECTOMY      CATARACT REMOVAL      EYE SURGERY      GALLBLADDER SURGERY      SPINE SURGERY  10/2016    spinal cord stimulator    TONSILLECTOMY AND ADENOIDECTOMY      TUMOR REMOVAL      fatty tumor    WRIST GANGLION EXCISION        As reviewed No family history on file. Social History     Tobacco Use    Smoking status: Never Smoker    Smokeless tobacco: Never Used   Substance Use Topics    Alcohol use: Yes     Comment: rarely, 1 cup of coffee daily      Review of Systems:    Constitutional: Negative for diaphoresis and fatigue  Psychological:Negative for anxiety or depression  HENT: Negative for headaches, nasal congestion, sinus pain or vertigo  Eyes: Negative for visual disturbance.    Endocrine: Negative for polydipsia/polyuria  Respiratory: Negative for shortness of breath  Cardiovascular: Negative for chest pain, dyspnea on exertion, claudication, edema, irregular heartbeat, murmur, palpitations or shortness of breath  Gastrointestinal: Negative for abdominal pain or heartburn  Genito-Urinary: Negative for urinary frequency/urgency  Musculoskeletal: Negative for muscle pain, muscular weakness, negative for pain in arm and leg or swelling in foot and leg  Neurological: Negative for dizziness, headaches, memory loss, numbness/tingling, visual changes, syncope  Dermatological: Negative for rash    Objective:    Vitals:    10/19/20 0917   BP: 136/80   Pulse: 84   Weight: 165 lb 8 oz (75.1 kg)   Height: 5' 1\" (1.549 m)     /80   Pulse 84   Ht 5' 1\" (1.549 m)   Wt 165 lb 8 oz (75.1 kg)   BMI 31.27 kg/m²     Patient-Reported Vitals 6/15/2020   Patient-Reported Weight 167lb   Patient-Reported Height 5 1   Patient-Reported Systolic 610   Patient-Reported Diastolic 96   Patient-Reported Pulse 67        Wt Readings from Last 3 Encounters:   10/19/20 165 lb 8 oz (75.1 kg) 05/20/19 174 lb 6.4 oz (79.1 kg)   08/24/18 194 lb 12.8 oz (88.4 kg)     Body mass index is 31.27 kg/m². GENERAL - Alert, oriented, pleasant, in no apparent distress. EYES: No jaundice, no conjunctival pallor. SKIN: It is warm & dry. No rashes. No Echhymosis    HEENT - No clinically significant abnormalities seen. Neck - Supple. No jugular venous distention noted. No carotid bruits. Cardiovascular - Normal S1 and S2 without obvious murmur or gallop. Extremities - No cyanosis, clubbing, or significant edema. Pulmonary - No respiratory distress. No wheezes or rales. Abdomen - No masses, tenderness, or organomegaly. Musculoskeletal - No significant edema. No joint deformities. No muscle wasting. Neurologic - Cranial nerves II through XII are grossly intact. There were no gross focal neurologic abnormalities. Lab Review   No results found for: CKTOTAL, CKMB, CKMBINDEX, TROPONINT  BNP:  No results found for: BNP  PT/INR:  No results found for: INR  No results found for: LABA1C  Lab Results   Component Value Date    WBC 6.2 07/20/2017    HCT 40.9 07/20/2017    MCV 93.6 07/20/2017     07/20/2017     Lab Results   Component Value Date    CHOL 177 07/20/2017    TRIG 125 07/20/2017    HDL 62 07/20/2017    LDLCALC 90 07/20/2017    LDLDIRECT 115 (H) 10/14/2011     Lab Results   Component Value Date    ALT 15 07/20/2017    AST 20 07/20/2017     BMP:    Lab Results   Component Value Date     07/20/2017    K 4.5 07/20/2017     07/20/2017    CO2 28 07/20/2017    BUN 20 07/20/2017    CREATININE 0.8 07/20/2017     CMP:   Lab Results   Component Value Date     07/20/2017    K 4.5 07/20/2017     07/20/2017    CO2 28 07/20/2017    BUN 20 07/20/2017    PROT 6.6 07/20/2017     TSH:  No results found for: TSH, TSHHS        Impression:    No diagnosis found.    Patient Active Problem List   Diagnosis Code    Dizzy spells R42    Hyperlipidemia E78.5    SOB (shortness of breath) R06.02

## 2021-05-03 ENCOUNTER — OFFICE VISIT (OUTPATIENT)
Dept: CARDIOLOGY CLINIC | Age: 86
End: 2021-05-03
Payer: MEDICARE

## 2021-05-03 VITALS
HEART RATE: 74 BPM | SYSTOLIC BLOOD PRESSURE: 124 MMHG | HEIGHT: 60 IN | WEIGHT: 167 LBS | DIASTOLIC BLOOD PRESSURE: 82 MMHG | BODY MASS INDEX: 32.79 KG/M2

## 2021-05-03 DIAGNOSIS — I10 ESSENTIAL HYPERTENSION: Primary | ICD-10-CM

## 2021-05-03 PROCEDURE — G8417 CALC BMI ABV UP PARAM F/U: HCPCS | Performed by: INTERNAL MEDICINE

## 2021-05-03 PROCEDURE — G8400 PT W/DXA NO RESULTS DOC: HCPCS | Performed by: INTERNAL MEDICINE

## 2021-05-03 PROCEDURE — 99213 OFFICE O/P EST LOW 20 MIN: CPT | Performed by: INTERNAL MEDICINE

## 2021-05-03 PROCEDURE — 1036F TOBACCO NON-USER: CPT | Performed by: INTERNAL MEDICINE

## 2021-05-03 PROCEDURE — 1090F PRES/ABSN URINE INCON ASSESS: CPT | Performed by: INTERNAL MEDICINE

## 2021-05-03 PROCEDURE — 1123F ACP DISCUSS/DSCN MKR DOCD: CPT | Performed by: INTERNAL MEDICINE

## 2021-05-03 PROCEDURE — G8427 DOCREV CUR MEDS BY ELIG CLIN: HCPCS | Performed by: INTERNAL MEDICINE

## 2021-05-03 PROCEDURE — 4040F PNEUMOC VAC/ADMIN/RCVD: CPT | Performed by: INTERNAL MEDICINE

## 2021-07-16 RX ORDER — DILTIAZEM HYDROCHLORIDE 120 MG/1
CAPSULE, COATED, EXTENDED RELEASE ORAL
Qty: 90 CAPSULE | Refills: 3 | OUTPATIENT
Start: 2021-07-16

## 2021-07-26 RX ORDER — DILTIAZEM HYDROCHLORIDE 120 MG/1
120 CAPSULE, COATED, EXTENDED RELEASE ORAL DAILY
Qty: 90 CAPSULE | Refills: 3 | Status: SHIPPED | OUTPATIENT
Start: 2021-07-26 | End: 2022-08-04 | Stop reason: SDUPTHER

## 2022-07-21 RX ORDER — DILTIAZEM HYDROCHLORIDE 120 MG/1
CAPSULE, COATED, EXTENDED RELEASE ORAL
Qty: 90 CAPSULE | Refills: 3 | OUTPATIENT
Start: 2022-07-21

## 2022-08-01 RX ORDER — DILTIAZEM HYDROCHLORIDE 120 MG/1
CAPSULE, COATED, EXTENDED RELEASE ORAL
Qty: 90 CAPSULE | Refills: 3 | OUTPATIENT
Start: 2022-08-01

## 2022-08-04 RX ORDER — DILTIAZEM HYDROCHLORIDE 120 MG/1
120 CAPSULE, COATED, EXTENDED RELEASE ORAL DAILY
Qty: 90 CAPSULE | Refills: 0 | Status: SHIPPED | OUTPATIENT
Start: 2022-08-04

## 2022-09-13 ENCOUNTER — OFFICE VISIT (OUTPATIENT)
Dept: CARDIOLOGY CLINIC | Age: 87
End: 2022-09-13
Payer: MEDICARE

## 2022-09-13 VITALS
DIASTOLIC BLOOD PRESSURE: 74 MMHG | SYSTOLIC BLOOD PRESSURE: 126 MMHG | HEIGHT: 60 IN | WEIGHT: 180 LBS | BODY MASS INDEX: 35.34 KG/M2 | TEMPERATURE: 104 F

## 2022-09-13 DIAGNOSIS — R06.02 SOB (SHORTNESS OF BREATH): Primary | ICD-10-CM

## 2022-09-13 PROCEDURE — G8427 DOCREV CUR MEDS BY ELIG CLIN: HCPCS | Performed by: INTERNAL MEDICINE

## 2022-09-13 PROCEDURE — 1036F TOBACCO NON-USER: CPT | Performed by: INTERNAL MEDICINE

## 2022-09-13 PROCEDURE — 99214 OFFICE O/P EST MOD 30 MIN: CPT | Performed by: INTERNAL MEDICINE

## 2022-09-13 PROCEDURE — 1123F ACP DISCUSS/DSCN MKR DOCD: CPT | Performed by: INTERNAL MEDICINE

## 2022-09-13 PROCEDURE — 1090F PRES/ABSN URINE INCON ASSESS: CPT | Performed by: INTERNAL MEDICINE

## 2022-09-13 PROCEDURE — G8400 PT W/DXA NO RESULTS DOC: HCPCS | Performed by: INTERNAL MEDICINE

## 2022-09-13 PROCEDURE — G8417 CALC BMI ABV UP PARAM F/U: HCPCS | Performed by: INTERNAL MEDICINE

## 2022-09-13 PROCEDURE — 93000 ELECTROCARDIOGRAM COMPLETE: CPT | Performed by: INTERNAL MEDICINE

## 2022-09-13 NOTE — PROGRESS NOTES
CARDIOLOGY NOTE      9/13/2022    RE: Sona Topher  (1935)                               TO:  Dr. Baxter primary care provider on file. Kaylin Galdamez is a 81 y.o. female who was seen today for management of  htn                                    HPI:                   Pt has h/o hypertension, hyperlipidemia, seen today for follow-up. Pt has  cardiac complaints of worsening shortness of breath mostly with exertion however has no chest pain these exam shortness of breath has been going on for the past few weeks which is a new symptom for her    Sonaangelica Rosaser has the following history recorded in care path:  Patient Active Problem List    Diagnosis Date Noted    Anginal equivalent ([facility] [address] 08/24/2018    HTN (hypertension) 04/27/2015    SOB (shortness of breath) 04/26/2013    Dizzy spells     Hyperlipidemia      Current Outpatient Medications   Medication Sig Dispense Refill    dilTIAZem (CARDIZEM CD) 120 MG extended release capsule Take 1 capsule by mouth in the morning. 90 capsule 0    lisinopril (PRINIVIL;ZESTRIL) 40 MG tablet Take 40 mg by mouth daily      traMADol (ULTRAM) 50 MG tablet Take 50 mg by mouth 2 times daily. atorvastatin (LIPITOR) 10 MG tablet Take 10 mg by mouth daily      Magnesium 500 MG CAPS Take by mouth      vitamin E 1000 units capsule Take 1,000 Units by mouth daily      meclizine (ANTIVERT) 25 MG tablet Take 25 mg by mouth 3 times daily as needed      Potassium Chloride Katrin CR (KLOR-CON M20 PO) Take 1 tablet by mouth daily      escitalopram (LEXAPRO) 20 MG tablet Take 10 mg by mouth daily       meloxicam (MOBIC) 15 MG tablet Take 15 mg by mouth daily      Calcium Carb-Cholecalciferol (CALCIUM PLUS VITAMIN D3 PO) Take 1 tablet by mouth daily      Acetaminophen (TYLENOL PO) Take 2 tablets by mouth daily      Cholecalciferol (VITAMIN D3) 2000 UNITS CAPS Take 2 tablets by mouth daily       furosemide (LASIX) 40 MG tablet Take 40 mg by mouth daily. raloxifene (EVISTA) 60 MG tablet Take 60 mg by mouth daily. aspirin 81 MG tablet Take 81 mg by mouth daily. multivitamin (THERAGRAN) per tablet Take 1 tablet by mouth daily. Pantoprazole Sodium (PROTONIX) 40 MG PACK packet Take 40 mg by mouth daily. b complex vitamins capsule Take 1 capsule by mouth daily. FISH OIL 1,000 mg by Does not apply route daily. Elastic Bandages & Supports (MEDICAL COMPRESSION THIGH HIGH) MISC 1 Package by Does not apply route daily Wear daily and remove nightly   20 - 30 mmgh (Patient not taking: Reported on 5/3/2021) 1 each 1     No current facility-administered medications for this visit. Allergies: Patient has no known allergies. Past Medical History:   Diagnosis Date    Depression     Dizzy spells     H/O 24 hour EKG monitoring 06/28/2006 06/28/2006 predominant rhythm is sinus, no ectopy noted, infrequent ventricular and supraventricular ectopic beats are seen, the max heart rate is 135, min 59 with an average of 77 bpm, longest R-R interval is 1.1 seconds    H/O cardiovascular stress test 06/26/2006 EF 67%, 08/9/2010 EF 70%, 10/18/2011 EF 70%    10/18/2011 EF 70%normal pattern of perfusion in all myocardial regions, resting ED 56ml, resting ES 12ml, there is no scintigraphic evidence of inducible myocardial ischemia, global left ventricular systolic function is normal, exercise capacity 4.6 METS it is mildly reduced, NO ECG changes    H/O cardiovascular stress test 5/13/2016    lexiscan-normal,EF70%    H/O complete electrocardiogram 08/05/2010 08/05/2010 on chart    H/O Doppler carotid ultrasound 07/09/2020    Mild (0-49%) disease of the Bilateral Internal carotid artery.      H/O echocardiogram 4/30/13,8/6/09 4/13- WNL, EF >55% 08/09 EF 55% left ventricular systolic function is normal, mild aortic valve calcification, No PE    H/O echocardiogram 5/13/2016    EF 55-60%, Normal study    Hx of cardiovascular stress test 08/30/2018    EF 60% Normal study    Hyperlipidemia     Hypertension     PVD (peripheral vascular disease) (HCC)     Reflux     SOB (shortness of breath)     Wears glasses      Past Surgical History:   Procedure Laterality Date    BUNIONECTOMY      CATARACT REMOVAL      EYE SURGERY      GALLBLADDER SURGERY      SPINE SURGERY  10/2016    spinal cord stimulator    TONSILLECTOMY AND ADENOIDECTOMY      TUMOR REMOVAL      fatty tumor    WRIST GANGLION EXCISION        As reviewed No family history on file. Social History     Tobacco Use    Smoking status: Never    Smokeless tobacco: Never   Substance Use Topics    Alcohol use: Not Currently     Comment: rarely, 1 cup of coffee daily        Objective:    Vitals:    09/13/22 1129   BP: 126/74   Temp: (!) 104 °F (40 °C)   Weight: 180 lb (81.6 kg)   Height: 4' 11.5\" (1.511 m)     /74   Temp (!) 104 °F (40 °C)   Ht 4' 11.5\" (1.511 m)   Wt 180 lb (81.6 kg)   BMI 35.75 kg/m²     Patient-Reported Vitals 6/15/2020   Patient-Reported Weight 167lb   Patient-Reported Height 5 1   Patient-Reported Systolic 586   Patient-Reported Diastolic 96   Patient-Reported Pulse 67        Wt Readings from Last 3 Encounters:   09/13/22 180 lb (81.6 kg)   05/03/21 167 lb (75.8 kg)   10/19/20 165 lb 8 oz (75.1 kg)     Body mass index is 35.75 kg/m². GENERAL - Alert, oriented, pleasant, in no apparent distress. EYES: No jaundice, no conjunctival pallor. SKIN: It is warm & dry. No rashes. No Echhymosis    HEENT - No clinically significant abnormalities seen. Neck - Supple. No jugular venous distention noted. No carotid bruits. Cardiovascular - Normal S1 and S2 without obvious murmur or gallop. Extremities - No cyanosis, clubbing, or significant edema. Pulmonary - No respiratory distress. No wheezes or rales. Abdomen - No masses, tenderness, or organomegaly. Musculoskeletal - No significant edema. No joint deformities. No muscle wasting.   Neurologic - Cranial nerves II through XII are grossly intact. There were no gross focal neurologic abnormalities. Lab Review   No results found for: CKTOTAL, CKMB, CKMBINDEX, TROPONINT  BNP:  No results found for: BNP  PT/INR:  No results found for: INR  No results found for: LABA1C  Lab Results   Component Value Date    WBC 6.2 07/20/2017    HCT 40.9 07/20/2017    MCV 93.6 07/20/2017     07/20/2017     Lab Results   Component Value Date    CHOL 177 07/20/2017    TRIG 125 07/20/2017    HDL 62 07/20/2017    LDLCALC 90 07/20/2017    LDLDIRECT 115 (H) 10/14/2011     Lab Results   Component Value Date    ALT 15 07/20/2017    AST 20 07/20/2017     BMP:    Lab Results   Component Value Date/Time     07/20/2017 10:43 AM    K 4.5 07/20/2017 10:43 AM     07/20/2017 10:43 AM    CO2 28 07/20/2017 10:43 AM    BUN 20 07/20/2017 10:43 AM    CREATININE 0.8 07/20/2017 10:43 AM     CMP:   Lab Results   Component Value Date/Time     07/20/2017 10:43 AM    K 4.5 07/20/2017 10:43 AM     07/20/2017 10:43 AM    CO2 28 07/20/2017 10:43 AM    BUN 20 07/20/2017 10:43 AM    PROT 6.6 07/20/2017 10:43 AM     TSH:  No results found for: TSH, TSHHS        Assessment & Plan:    -Exertional shortness of breath which is anginal equivalent in her case given that she has multiple cardiac risk factors we will obtain a Lexiscan and echocardiogram for further assessment further management will depend on the results of these test and her office follow-up    -  Hypertension: Patients blood pressure is normal. Patient is advised about low sodium diet. Present medical regimen will not be changed. On Cardizem and Prinivil blood pressure is well controlled we will continue present medical therapy      -  LIPID MANAGEMENT:  Importance of lipid levels discussed with patient   and patient was given dietary advice. NCEP- ATP III guidelines reviewed with patient.     -   Changes  in medicines made: No     On Lipitor 10 mg p.o. daily we will check the lipid profile    Mortality from the morbid obesity is very high: Body mass index is 35.75 kg/m². Carloz Arzate MD    [facility] [place]    Please note this report has been partially produced using speech recognition software and may contain errors related to that system including errors in grammar, punctuation, and spelling, as well as words and phrases that may be inappropriate. If there are any questions or concerns please feel free to contact the dictating provider for clarification.

## 2022-09-14 ENCOUNTER — PROCEDURE VISIT (OUTPATIENT)
Dept: CARDIOLOGY CLINIC | Age: 87
End: 2022-09-14
Payer: MEDICARE

## 2022-09-14 DIAGNOSIS — R06.02 SOB (SHORTNESS OF BREATH): ICD-10-CM

## 2022-09-14 LAB
LV EF: 58 %
LVEF MODALITY: NORMAL

## 2022-09-14 PROCEDURE — 93306 TTE W/DOPPLER COMPLETE: CPT | Performed by: INTERNAL MEDICINE

## 2022-09-16 ENCOUNTER — TELEPHONE (OUTPATIENT)
Dept: CARDIOLOGY CLINIC | Age: 87
End: 2022-09-16

## 2022-09-16 ENCOUNTER — PROCEDURE VISIT (OUTPATIENT)
Dept: CARDIOLOGY CLINIC | Age: 87
End: 2022-09-16
Payer: MEDICARE

## 2022-09-16 DIAGNOSIS — R06.02 SOB (SHORTNESS OF BREATH): ICD-10-CM

## 2022-09-16 LAB
LV EF: 60 %
LVEF MODALITY: NORMAL

## 2022-09-16 PROCEDURE — A9500 TC99M SESTAMIBI: HCPCS | Performed by: INTERNAL MEDICINE

## 2022-09-16 PROCEDURE — 93015 CV STRESS TEST SUPVJ I&R: CPT | Performed by: INTERNAL MEDICINE

## 2022-09-16 PROCEDURE — 78452 HT MUSCLE IMAGE SPECT MULT: CPT | Performed by: INTERNAL MEDICINE

## 2022-09-16 NOTE — TELEPHONE ENCOUNTER
Called to advise of Echo results -  Summary   Left ventricular function and size is normal, EF is estimated at 55-60%. Moderate left ventricular hypertrophy. Normal diastolic filling pattern for age. No regional wall motion abnormalities were detected. Mitral annular calcification is present. Moderate mitral and mild tricuspid regurgitation is present. RVSP is 27 mmHg. No evidence of pericardial effusion.

## 2022-09-21 ENCOUNTER — TELEPHONE (OUTPATIENT)
Dept: CARDIOLOGY CLINIC | Age: 87
End: 2022-09-21

## 2022-09-21 NOTE — TELEPHONE ENCOUNTER
Called to advise of NM results -   Summary    Supervising physician Dr. Hank Alves . Small sized defect of mild severity which is reversible involving anterior    wall of myocardium. Abnormal Stress nuclear scintigraphic study suggestive    of abnormal myocardial perfusion. Mild degree of anterior wall ischemia    noted involving a small sized area of left ventricular myocardium. Gated    images demonstrate normal left ventricular systolic function with EF of 60    %. Recommendation    Suggest office visit to discuss results . Also advised of Echo results -  Summary   Left ventricular function and size is normal, EF is estimated at 55-60%. Moderate left ventricular hypertrophy. Normal diastolic filling pattern for age. No regional wall motion abnormalities were detected. Mitral annular calcification is present. Moderate mitral and mild tricuspid regurgitation is present. RVSP is 27 mmHg. No evidence of pericardial effusion. Pt understood results given.  To call pt this evening.

## 2022-09-22 ENCOUNTER — TELEPHONE (OUTPATIENT)
Dept: CARDIOLOGY CLINIC | Age: 87
End: 2022-09-22

## 2022-09-22 NOTE — TELEPHONE ENCOUNTER
Please call about results of stress test and echo  as patient is leaving for [place] . Needs Dr to call preferred. Was told was going to call evening of 09/21/22.

## 2022-09-23 ENCOUNTER — TELEPHONE (OUTPATIENT)
Dept: CARDIOLOGY CLINIC | Age: 87
End: 2022-09-23

## 2022-09-23 NOTE — TELEPHONE ENCOUNTER
[place] cardiologist:  Dr Ruddy Clark at  [facility] phone [phone], fx [phone]    OV and testing faxed none

## 2022-10-20 RX ORDER — DILTIAZEM HYDROCHLORIDE 120 MG/1
CAPSULE, COATED, EXTENDED RELEASE ORAL
Qty: 90 CAPSULE | Refills: 0 | OUTPATIENT
Start: 2022-10-20

## 2022-10-31 ENCOUNTER — TELEPHONE (OUTPATIENT)
Dept: CARDIOLOGY CLINIC | Age: 87
End: 2022-10-31

## 2024-06-25 ENCOUNTER — OFFICE VISIT (OUTPATIENT)
Dept: CARDIOLOGY CLINIC | Age: 89
End: 2024-06-25
Payer: MEDICARE

## 2024-06-25 VITALS
SYSTOLIC BLOOD PRESSURE: 120 MMHG | WEIGHT: 181.2 LBS | BODY MASS INDEX: 35.57 KG/M2 | DIASTOLIC BLOOD PRESSURE: 72 MMHG | HEART RATE: 69 BPM | HEIGHT: 60 IN

## 2024-06-25 DIAGNOSIS — R06.02 SOB (SHORTNESS OF BREATH): Primary | ICD-10-CM

## 2024-06-25 PROCEDURE — 1123F ACP DISCUSS/DSCN MKR DOCD: CPT | Performed by: INTERNAL MEDICINE

## 2024-06-25 PROCEDURE — 93000 ELECTROCARDIOGRAM COMPLETE: CPT | Performed by: INTERNAL MEDICINE

## 2024-06-25 PROCEDURE — 99214 OFFICE O/P EST MOD 30 MIN: CPT | Performed by: INTERNAL MEDICINE

## 2024-06-25 PROCEDURE — 1036F TOBACCO NON-USER: CPT | Performed by: INTERNAL MEDICINE

## 2024-06-25 PROCEDURE — G8417 CALC BMI ABV UP PARAM F/U: HCPCS | Performed by: INTERNAL MEDICINE

## 2024-06-25 PROCEDURE — G8427 DOCREV CUR MEDS BY ELIG CLIN: HCPCS | Performed by: INTERNAL MEDICINE

## 2024-06-25 PROCEDURE — 1090F PRES/ABSN URINE INCON ASSESS: CPT | Performed by: INTERNAL MEDICINE

## 2024-06-25 NOTE — PROGRESS NOTES
distress.  No wheezes or rales.    Abdomen - No masses, tenderness, or organomegaly.  Musculoskeletal - No significant edema. No joint deformities. No muscle wasting.  Neurologic - Cranial nerves II through XII are grossly intact.  There were no gross focal neurologic abnormalities.    Lab Review   No results found for: \"CKTOTAL\", \"CKMB\", \"CKMBINDEX\", \"TROPONINT\"  BNP:  No results found for: \"BNP\"  PT/INR:  No results found for: \"INR\"  No results found for: \"LABA1C\"  Lab Results   Component Value Date    WBC 6.2 07/20/2017    HGB 14.0 07/20/2017    HCT 40.9 07/20/2017    MCV 93.6 07/20/2017     07/20/2017     Lab Results   Component Value Date    CHOL 177 07/20/2017    TRIG 125 07/20/2017    HDL 62 07/20/2017    LDLDIRECT 115 (H) 10/14/2011     Lab Results   Component Value Date    ALT 15 07/20/2017    AST 20 07/20/2017     BMP:    Lab Results   Component Value Date/Time     07/20/2017 10:43 AM    K 4.5 07/20/2017 10:43 AM     07/20/2017 10:43 AM    CO2 28 07/20/2017 10:43 AM    BUN 20 07/20/2017 10:43 AM    CREATININE 0.8 07/20/2017 10:43 AM     CMP:   Lab Results   Component Value Date/Time     07/20/2017 10:43 AM    K 4.5 07/20/2017 10:43 AM     07/20/2017 10:43 AM    CO2 28 07/20/2017 10:43 AM    BUN 20 07/20/2017 10:43 AM     TSH:  No results found for: \"TSH\", \"TSHHS\"  Poor parasternal image quality. BP of 148/84 mmHg. IV ocntrast was not  used.  2. The left ventricular size is normal.  3. Suboptimal imaging of left ventricular wall thickness particularly of the  basal anteroseptal wall which may be upper normal to mildly thickened.  4. The LV function is normal.  5. RV cavity size is normal. RV global systolic function is normal.  6. There is mild mitral regurgitation.  7. The tricuspid regurgitant velocity is 2.75 m/s, and with an assumed right  atrial pressure of 3 mmHg, the estimated right ventricular systolic pressure  is normal at 33.2 mmHg.    8. There are no prior exams  Local with sedation

## 2024-06-25 NOTE — PATIENT INSTRUCTIONS
Please be informed that if you contact our office outside of normal business hours the physician on call cannot help with any scheduling or rescheduling issues, procedure instruction questions or any type of medication issue.    We advise you for any urgent/emergency that you go to the nearest emergency room!    PLEASE CALL OUR OFFICE DURING NORMAL BUSINESS HOURS    Monday - Friday   8 am to 5 pm    [place]: [phone]    [place]: [phone]    [place]:  [phone]    **It is YOUR responsibilty to bring medication bottles and/or updated medication list to EACH APPOINTMENT. This will allow us to better serve you and all your healthcare needs**    Thank you for allowing us to care for you today!   We want to ensure we can follow your treatment plan and we strive to give you the best outcomes and experience possible.   If you ever have a life threatening emergency and call 911 - for an ambulance (EMS)   Our providers can only care for you at:   [facility] or [facility].   Even if you have someone take you or you drive yourself we can only care for you in a [facility] facility. Our providers are not setup at the other healthcare locations!

## 2024-06-27 ENCOUNTER — TELEPHONE (OUTPATIENT)
Dept: CARDIOLOGY CLINIC | Age: 89
End: 2024-06-27

## 2024-06-27 NOTE — TELEPHONE ENCOUNTER
Pt wants information OV , testing reports sent to this cardiologist to set up heart cath in [place]    DR. Bossman Muñoz ,[place]   [phone]    [facility]

## 2024-06-28 NOTE — TELEPHONE ENCOUNTER
I faxed the last OV & EKG dos 6/25/2024- to [phone]. On 10/31/2022 I faxed all the previous records, I called Dr Samuels office to verify they have those records and they do.

## 2024-07-01 ENCOUNTER — TELEPHONE (OUTPATIENT)
Dept: CARDIOLOGY CLINIC | Age: 89
End: 2024-07-01

## 2024-07-01 DIAGNOSIS — R06.02 SOB (SHORTNESS OF BREATH): ICD-10-CM

## 2024-07-01 DIAGNOSIS — R94.39 ABNORMAL NUCLEAR STRESS TEST: Primary | ICD-10-CM

## 2024-07-01 NOTE — TELEPHONE ENCOUNTER
Patient called and said new doctor has received records but no order for a heart cath.  She is asking for us to fax order to Dr Samuels in [place], [place] at [facility] fax [phone].

## 2024-07-31 ENCOUNTER — TELEPHONE (OUTPATIENT)
Dept: CARDIOLOGY CLINIC | Age: 89
End: 2024-07-31

## 2024-07-31 NOTE — TELEPHONE ENCOUNTER
Jacqueline--Patient moved and is No longer a patient here, She is asking for us to fax records to Dr Samuels in [place], [place] at [facility] fax [phone].

## 2024-08-01 NOTE — TELEPHONE ENCOUNTER
I called the pt, I told her that I sent the last  OV and EKG on 6/27/24- she has her 1st appt on 8/20/24- I said if they want more DOS, sign  a release and they can fax it to us and we will send all the records they are needing,  I said that if we send all her records now without her being seen yet, they may not be applied.  She understood.